# Patient Record
Sex: MALE | Race: WHITE | Employment: OTHER | URBAN - METROPOLITAN AREA
[De-identification: names, ages, dates, MRNs, and addresses within clinical notes are randomized per-mention and may not be internally consistent; named-entity substitution may affect disease eponyms.]

---

## 2020-06-27 ENCOUNTER — APPOINTMENT (OUTPATIENT)
Dept: CT IMAGING | Age: 40
End: 2020-06-27
Payer: COMMERCIAL

## 2020-06-27 ENCOUNTER — HOSPITAL ENCOUNTER (EMERGENCY)
Age: 40
Discharge: HOME | End: 2020-06-28
Payer: COMMERCIAL

## 2020-06-27 VITALS
WEIGHT: 300 LBS | BODY MASS INDEX: 42 KG/M2 | RESPIRATION RATE: 18 BRPM | OXYGEN SATURATION: 97 % | DIASTOLIC BLOOD PRESSURE: 88 MMHG | SYSTOLIC BLOOD PRESSURE: 151 MMHG | TEMPERATURE: 98.6 F | HEART RATE: 71 BPM | HEIGHT: 71 IN

## 2020-06-27 LAB
A/G RATIO: 1.2 (ref 1.1–2.2)
ALBUMIN SERPL-MCNC: 4.4 G/DL (ref 3.4–5)
ALP BLD-CCNC: 72 U/L (ref 40–129)
ALT SERPL-CCNC: 55 U/L (ref 10–40)
ANION GAP SERPL CALCULATED.3IONS-SCNC: 10 MMOL/L (ref 3–16)
AST SERPL-CCNC: 91 U/L (ref 15–37)
BASOPHILS ABSOLUTE: 0.1 K/UL (ref 0–0.2)
BASOPHILS RELATIVE PERCENT: 1 %
BILIRUB SERPL-MCNC: 0.4 MG/DL (ref 0–1)
BILIRUBIN URINE: NEGATIVE
BLOOD, URINE: ABNORMAL
BUN BLDV-MCNC: 22 MG/DL (ref 7–20)
CALCIUM SERPL-MCNC: 10 MG/DL (ref 8.3–10.6)
CHLORIDE BLD-SCNC: 98 MMOL/L (ref 99–110)
CLARITY: CLEAR
CO2: 30 MMOL/L (ref 21–32)
COLOR: YELLOW
CREAT SERPL-MCNC: 1.2 MG/DL (ref 0.9–1.3)
EOSINOPHILS ABSOLUTE: 0.2 K/UL (ref 0–0.6)
EOSINOPHILS RELATIVE PERCENT: 1.5 %
EPITHELIAL CELLS, UA: 0 /HPF (ref 0–5)
GFR AFRICAN AMERICAN: >60
GFR NON-AFRICAN AMERICAN: >60
GLOBULIN: 3.7 G/DL
GLUCOSE BLD-MCNC: 91 MG/DL (ref 70–99)
GLUCOSE URINE: NEGATIVE MG/DL
HCT VFR BLD CALC: 56.3 % (ref 40.5–52.5)
HEMOGLOBIN: 19.2 G/DL (ref 13.5–17.5)
HYALINE CASTS: 0 /LPF (ref 0–8)
KETONES, URINE: NEGATIVE MG/DL
LEUKOCYTE ESTERASE, URINE: NEGATIVE
LYMPHOCYTES ABSOLUTE: 2.6 K/UL (ref 1–5.1)
LYMPHOCYTES RELATIVE PERCENT: 24.2 %
MCH RBC QN AUTO: 32.3 PG (ref 26–34)
MCHC RBC AUTO-ENTMCNC: 34.1 G/DL (ref 31–36)
MCV RBC AUTO: 94.8 FL (ref 80–100)
MICROSCOPIC EXAMINATION: YES
MONOCYTES ABSOLUTE: 1 K/UL (ref 0–1.3)
MONOCYTES RELATIVE PERCENT: 9.8 %
NEUTROPHILS ABSOLUTE: 6.7 K/UL (ref 1.7–7.7)
NEUTROPHILS RELATIVE PERCENT: 63.5 %
NITRITE, URINE: NEGATIVE
PDW BLD-RTO: 15.3 % (ref 12.4–15.4)
PH UA: 6 (ref 5–8)
PLATELET # BLD: 265 K/UL (ref 135–450)
PMV BLD AUTO: 8.1 FL (ref 5–10.5)
POTASSIUM SERPL-SCNC: 3.7 MMOL/L (ref 3.5–5.1)
PROTEIN UA: 100 MG/DL
RBC # BLD: 5.94 M/UL (ref 4.2–5.9)
RBC UA: 8 /HPF (ref 0–4)
SODIUM BLD-SCNC: 138 MMOL/L (ref 136–145)
SPECIFIC GRAVITY UA: 1.03 (ref 1–1.03)
TOTAL PROTEIN: 8.1 G/DL (ref 6.4–8.2)
URINE REFLEX TO CULTURE: ABNORMAL
URINE TYPE: ABNORMAL
UROBILINOGEN, URINE: 0.2 E.U./DL
WBC # BLD: 10.6 K/UL (ref 4–11)
WBC UA: 1 /HPF (ref 0–5)

## 2020-06-27 PROCEDURE — 74176 CT ABD & PELVIS W/O CONTRAST: CPT

## 2020-06-27 PROCEDURE — 6360000002 HC RX W HCPCS: Performed by: NURSE PRACTITIONER

## 2020-06-27 PROCEDURE — 96374 THER/PROPH/DIAG INJ IV PUSH: CPT

## 2020-06-27 PROCEDURE — 80053 COMPREHEN METABOLIC PANEL: CPT

## 2020-06-27 PROCEDURE — 81003 URINALYSIS AUTO W/O SCOPE: CPT

## 2020-06-27 PROCEDURE — 96375 TX/PRO/DX INJ NEW DRUG ADDON: CPT

## 2020-06-27 PROCEDURE — 99284 EMERGENCY DEPT VISIT MOD MDM: CPT

## 2020-06-27 PROCEDURE — 85025 COMPLETE CBC W/AUTO DIFF WBC: CPT

## 2020-06-27 RX ORDER — CYCLOBENZAPRINE HCL 10 MG
10 TABLET ORAL 3 TIMES DAILY PRN
Qty: 21 TABLET | Refills: 0 | Status: SHIPPED | OUTPATIENT
Start: 2020-06-27 | End: 2020-07-07

## 2020-06-27 RX ORDER — LIDOCAINE 4 G/G
1 PATCH TOPICAL DAILY
Qty: 30 PATCH | Refills: 0 | Status: SHIPPED | OUTPATIENT
Start: 2020-06-27 | End: 2020-07-27

## 2020-06-27 RX ORDER — IBUPROFEN 800 MG/1
800 TABLET ORAL EVERY 8 HOURS PRN
Qty: 20 TABLET | Refills: 0 | Status: SHIPPED | OUTPATIENT
Start: 2020-06-27

## 2020-06-27 RX ORDER — ORPHENADRINE CITRATE 30 MG/ML
60 INJECTION INTRAMUSCULAR; INTRAVENOUS ONCE
Status: COMPLETED | OUTPATIENT
Start: 2020-06-27 | End: 2020-06-27

## 2020-06-27 RX ORDER — KETOROLAC TROMETHAMINE 30 MG/ML
30 INJECTION, SOLUTION INTRAMUSCULAR; INTRAVENOUS ONCE
Status: COMPLETED | OUTPATIENT
Start: 2020-06-27 | End: 2020-06-27

## 2020-06-27 RX ADMIN — KETOROLAC TROMETHAMINE 30 MG: 30 INJECTION, SOLUTION INTRAMUSCULAR at 23:30

## 2020-06-27 RX ADMIN — ORPHENADRINE CITRATE 60 MG: 30 INJECTION INTRAMUSCULAR; INTRAVENOUS at 23:30

## 2020-06-27 ASSESSMENT — ENCOUNTER SYMPTOMS
BACK PAIN: 1
CHEST TIGHTNESS: 0
SHORTNESS OF BREATH: 0
VOMITING: 0
NAUSEA: 0
DIARRHEA: 0
ABDOMINAL PAIN: 0

## 2020-06-27 ASSESSMENT — PAIN SCALES - GENERAL: PAINLEVEL_OUTOF10: 8

## 2020-06-28 NOTE — ED PROVIDER NOTES
[facility]        Pt Name: Michael Gentile  MRN: [id number]  [place] 1980  Date of evaluation: 6/27/2020  Provider: CLAUDE Hannah CNP  PCP: Cindy Olivas    Evaluation by SANDIE. My supervising physician was available for consultation. CHIEF COMPLAINT       Chief Complaint   Patient presents with    Flank Pain     R flank pain off and on for ten days. states his urine has been \"foamy\". states he has been having trouble getting an erection as well during this time        HISTORY OF PRESENT ILLNESS   (Location, Timing/Onset, Context/Setting, Quality, Duration, Modifying Factors, Severity, Associated Signs and Symptoms)  Note limiting factors. Michael Gentile is a 35 y.o. male presents to the ER with a complaint of right sided flank pain. He reports that 10 days ago he was doing light day at the gym and felt a pain to the right mid back. States that his urine is foamy and he dribbles at the end. He is concerned due to issues of difficulty maintaining an erection, states that he was intimate with his wife and his erection was \"different\". States then the next day during masturbation, ejaculation was very painful. He denies any blood or painful urination. Reports history of kidney stone but states that that pain was different. Reports history of normal PSA recently. His pain is reproducible. Worse with leaning forward and lifting weight or twisting. Denies any headache, fever, lightheadedness, dizziness, visual disturbances. No chest pain or pressure. No neck pain. No shortness of breath, cough, or congestion. No abdominal pain, nausea, vomiting, diarrhea, constipation, or dysuria. No rash. Nursing Notes were all reviewed and agreed with or any disagreements were addressed in the HPI.     REVIEW OF SYSTEMS    (2-9 systems for level 4, 10 or more for level 5)     Review of Systems Constitutional: Negative for activity change, chills and fever. Respiratory: Negative for chest tightness and shortness of breath. Cardiovascular: Negative for chest pain. Gastrointestinal: Negative for abdominal pain, diarrhea, nausea and vomiting. Genitourinary: Positive for flank pain. Negative for dysuria. Difficulty with erection   Musculoskeletal: Positive for back pain. All other systems reviewed and are negative. Positives and Pertinent negatives as per HPI. Except as noted above in the ROS, all other systems were reviewed and negative. PAST MEDICAL HISTORY     Past Medical History:   Diagnosis Date    ADD (attention deficit disorder with hyperactivity)     Anxiety     B[facility]     Bipolar disorder ([facility]     Hypertension     Panic attack          SURGICAL HISTORY     Past Surgical History:   Procedure Laterality Date    CYST REMOVAL           CURRENTMEDICATIONS       Previous Medications    CARBAMAZEPINE (EPITOL PO)    Take  by mouth 2 times daily. CARBAMAZEPINE (EPITOL) 200 MG TABLET    Take 200 mg by mouth 3 times daily. LISINOPRIL (PRINIVIL;ZESTRIL) 10 MG TABLET    Take 10 mg by mouth daily. LISINOPRIL (PRINIVIL;ZESTRIL) 20 MG TABLET    Take 20 mg by mouth daily. LORATADINE (CLARITIN) 10 MG CAPS    Take  by mouth. LORATADINE (CLARITIN) 10 MG TABLET    Take 10 mg by mouth daily. NAPROXEN (NAPROSYN) 500 MG TABLET    Take 1 tablet by mouth 2 times daily for 20 doses. NONFORMULARY    Indications: BP med, tricor, lamictal, lipitor, claritin. unsure of dosages     QUETIAPINE FUMARATE (SEROQUEL PO)    Take  by mouth daily. SERTRALINE (ZOLOFT) 100 MG TABLET    Take 100 mg by mouth daily. SERTRALINE HCL (ZOLOFT PO)    Take  by mouth daily. ALLERGIES     Codeine and Codeine    FAMILYHISTORY     History reviewed. No pertinent family history.        SOCIAL HISTORY       Social History     Tobacco Use    Smoking otherwise noted below, none     Procedures    CRITICAL CARE TIME   N/A    CONSULTS:  None      EMERGENCY DEPARTMENT COURSE and DIFFERENTIAL DIAGNOSIS/MDM:   Vitals:    Vitals:    06/27/20 2120 06/27/20 2330   BP: (!) 191/112 (!) 159/85   Pulse: 104    Resp: 18    Temp: 98.6 °F (37 °C)    TempSrc: Oral    SpO2: 96%    Weight: 300 lb (136.1 kg)    Height: 5' 11\" (1.803 m)        Patient was given the following medications:  Medications   ketorolac (TORADOL) injection 30 mg (30 mg Intravenous Given 6/27/20 2330)   orphenadrine (NORFLEX) injection 60 mg (60 mg Intravenous Given 6/27/20 2330)           Briefly, this is a 35year old male that  presents to the ER with a complaint of right sided flank pain. He reports that 10 days ago he was doing light day at the gym and felt a pain to the right mid back. States that his urine is foamy and he dribbles at the end. He is concerned due to issues of difficulty maintaining an erection, states that he was intimate with his wife and his erection was \"different\". States then the next day during masturbation, ejaculation was very painful. He denies any blood or painful urination. Reports history of kidney stone but states that that pain was different. Reports history of normal PSA recently. His pain is reproducible. Worse with leaning forward and lifting weight or twisting. Patient was given Toradol and Norflex here in the ER. He does have transaminitis seen on CMP, otherwise unremarkable. CBC is unremarkable. UA does show mild hematuria. CT ABDOMEN PELVIS WO CONTRAST Additional Contrast? None (Final result)   Result time 06/27/20 23:09:17   Final result by Darrell Guaman MD (06/27/20 23:09:17)                Impression:    No acute finding in the abdomen or pelvis.  Specifically, there is no  urolithiasis or acute obstructive uropathy.     The gallbladder is contracted.  No calcified gallstones.  If there is  clinical concern for gallbladder disease,

## 2020-07-02 ENCOUNTER — HOSPITAL ENCOUNTER (OUTPATIENT)
Dept: PHYSICAL THERAPY | Age: 40
Setting detail: THERAPIES SERIES
Discharge: HOME | End: 2020-07-02
Payer: COMMERCIAL

## 2020-07-02 PROCEDURE — 97110 THERAPEUTIC EXERCISES: CPT

## 2020-07-02 PROCEDURE — 97161 PT EVAL LOW COMPLEX 20 MIN: CPT

## 2020-07-02 PROCEDURE — 97530 THERAPEUTIC ACTIVITIES: CPT

## 2020-07-02 NOTE — PLAN OF CARE
[address] [address], [facility] [address]  Phone: [phone]   Fax: [phone]                                                       HOLD PT PENDING F/U WITH UROLOGIST (SEE BELOW)      Physical Therapy Certification    Dear Referring Practitioner: LY Johns,    We had the pleasure of evaluating the following patient for physical therapy services at [address] [place] [address]. A summary of our findings can be found in the initial assessment below. This includes our plan of care. If you have any questions or concerns regarding these findings, please do not hesitate to contact me at the office phone number checked above. Thank you for the referral.       Physician Signature:_______________________________Date:__________________  By signing above (or electronic signature), therapists plan is approved by physician      Patient: Shanna Lopez   : 1980   MRN: [id number]  Referring Physician: Referring Practitioner: LY Johns      Evaluation Date: 2020      Medical Diagnosis Information:  Diagnosis: R flank pain, musculoskeletal pain R10.9, M79.18   Treatment Diagnosis: Right flank pain, positive TTP over righit obliques, decreased intermittent tolerance to twisting and ROM                                         Insurance information: PT Insurance Information: Jammie Blanc 30 visits/year      Precautions/ Contra-indications:   Latex Allergy:  [x]NO      []YES  Preferred Language for Healthcare:   [x]English       []other:    SUBJECTIVE: Patient stated complaint:  Pt went to ED on 20 for right flank pain, on and off for 10 days, also reports other factors such as foamy urine and difficulty getting an erection.   Findings at the ER was unremarkable Reports that 10 days ago he was doing a light day at the gym and felt a pain to the right mid back.  Pain is worse with leaning forward and lifting weight or twisting. ED felt right flank pain was due to muscular strain. Thinks it started when he was doing leg press (1200#), which is normally a good weight for him, wasn't wearing the belt at the time, and felt something in his low back. Says the pain didn't go away and decided to go to the ER. Says he doesn't have any problems with squatting 4-500#, but feels it in groin area when doing press and only when doing heavy rows (125#). Pt is a heavy , lifting weights 6 days/week. Said he couldn't lie on his right side, but was given medication, and was able to lie on his right side now ever since. Says hurts when turning and rotation to his left side when in bed. Sitting and twisting doesn't bother him. CT scan showed no kidney stones, but currently since the injury has noticed some pain in his testes. Relevant Medical History:  ADD, Bipolar, HTN, Panic Attack  Functional Outcome:      Pain Scale: 0/10  At rest, 6-2/44 with certain movements, only with leg press, and lying in bed and rotating to left.     Easing factors:   Provocative factors:     Type: []Constant   [x]Intermittent  []Radiating []Localized []Other:     Numbness/Tingling: denies N/T in LE, stated had a couple days of N/T in hands, but nothing since     Occupation/School:      Living Status/Prior Level of Function:Prior to this injury / incident, pt was independent with ADLs and IADLs,  Lives with wife in a 2 story home      OBJECTIVE:   Palpation: positive TTP at right obliques in left sidelying     Functional Mobility/Transfers:     Posture:     Bandages/Dressings/Incisions:     Gait: (include devices/WB status)        PROM AROM    L R L R   Hip Flexion   110 110   Hip Abduction       Hip ER       Hip IR       Knee Flexion       Knee Extension       Dorsiflexion        Plantarflexion        Inversion        Eversion       Shoulder                         Strength (0-5) / Myotomes Left Right   Hip Flexion - supine 5 5   Hip Flexion - seated (L1-2) 5 5   Hip Abduction 5 5   Hip Adduction 5 5   Hip ER 5 5   Hip IR 5 5   Quads (L2-4) 5 5   Hamstrings     Ankle Dorsiflexion (L4-5)     Ankle Plantarflexion (S1-2)     Ankle Inversion     Ankle Eversion (S1-2)     Great Toe Extension (L5)          Shoulder Flex 5 5   Shoulder Abd 5 5   Shoulder IR/ER 5 5   TrA 4+ 4+   Multifidus  4+ 4+   Flexibility     Hamstrings (90/90)     ITB Zuleima Sanderson)     Quads (Ely's)     Hip Flexor Corina Suzy)          Girth     Mid patella     Suprapatellar     Figure 8     Transmalleolar     Metatarsal Heads         Joint mobility:    []Normal    []Hypo   []Hyper    Orthopaedic Special Tests  Positive  Negative  NT Comments    Hip       CAMMIE / Ezio's  x     FADIR  x     Scour  x     Trendelenburg       SLUMP  X     Knee       Lachman's / Anterior Drawer       Posterior Drawer       Varus Stress       Valgus Stress       Kendall's        Appley's       Thessaly's       Patellar Tracking              Ankle       Anterior Drawer       Talar Tilt       Manzano       Nara's                   Balance:                        [x] Patient history, allergies, meds reviewed. Medical chart reviewed. See intake form. Review Of Systems (ROS):  [x]Performed Review of systems (Integumentary, CardioPulmonary, Neurological) by intake and observation. Intake form has been scanned into medical record. Patient has been instructed to contact their primary care physician regarding ROS issues if not already being addressed at this time.       Co-morbidities/Complexities (which will affect course of rehabilitation):   []None           Arthritic conditions   []Rheumatoid arthritis (M05.9)  []Osteoarthritis (M19.91)   Cardiovascular conditions   []Hypertension (I10)  []Hyperlipidemia (E78.5)  []Angina pectoris (I20)  []Atherosclerosis (I70)   Musculoskeletal conditions   []Disc pathology   []Congenital spine pathologies   []Prior positions or transfers between positions   []Reduced ability to maintain good posture and demonstrate good body mechanics with sitting, bending, and lifting   [x]Reduced ability to sleep   [] Reduced ability or tolerance with driving and/or computer work   [x]Reduced ability to perform lifting, carrying tasks   []Reduced ability to squat   []Reduced ability to forward bend   []Reduced ability to ambulate prolonged functional periods/distances/surfaces   []Reduced ability to ascend/descend stairs   []Reduced ability to run, hop, cut or jump   []other:    Participation Restrictions   [x]Reduced participation in self care activities   []Reduced participation in home management activities   []Reduced participation in work activities   []Reduced participation in social activities. [x]Reduced participation in sport/recreation activities. Classification :    []Signs/symptoms consistent with post-surgical status including decreased ROM, strength and function.    [x]Signs/symptoms consistent with joint sprain/strain   []Signs/symptoms consistent with patella-femoral syndrome   []Signs/symptoms consistent with knee OA/hip OA   []Signs/symptoms consistent with internal derangement of knee/Hip   []Signs/symptoms consistent with functional hip weakness/NMR control      []Signs/symptoms consistent with tendinitis/tendinosis    []signs/symptoms consistent with pathology which may benefit from Dry needling      []other:      Prognosis/Rehab Potential:      []Excellent   [x]Good    []Fair   []Poor    Tolerance of evaluation/treatment:    []Excellent   [x]Good    []Fair   []Poor    Physical Therapy Evaluation Complexity Justification  [x] A history of present problem with:  [x] no personal factors and/or comorbidities that impact the plan of care;  []1-2 personal factors and/or comorbidities that impact the plan of care  []3 personal factors and/or comorbidities that impact the plan of care  [x] An examination of body systems using standardized tests and measures addressing any of the following: body structures and functions (impairments), activity limitations, and/or participation restrictions;:  [x] a total of 1-2 or more elements   [] a total of 3 or more elements   [] a total of 4 or more elements   [x] A clinical presentation with:  [x] stable and/or uncomplicated characteristics   [] evolving clinical presentation with changing characteristics  [] unstable and unpredictable characteristics;   [x] Clinical decision making of [x] low, [] moderate, [] high complexity using standardized patient assessment instrument and/or measurable assessment of functional outcome. [x] EVAL (LOW) 27162 (typically 15 minutes face-to-face)  [] EVAL (MOD) 16945 (typically 30 minutes face-to-face)  [] EVAL (HIGH) 92219 (typically 45 minutes face-to-face)  [] RE-EVAL     PLAN:   Frequency/Duration:  1-2x/week x 4 weeks, but currently on hold   Interventions:  [x]  Therapeutic exercise including: strength training, ROM, for Lower extremity and core   [x]  NMR activation and proprioception for LE, Glutes and Core   [x]  Manual therapy as indicated for LE, Hip and spine to include: Dry Needling/IASTM, STM, PROM, Gr I-IV mobilizations, manipulation. [x] Modalities as needed that may include: thermal agents, E-stim, Biofeedback, US, iontophoresis as indicated  [x] Patient education on joint protection, postural re-education, activity modification, progression of HEP. HEP instruction: Written HEP instructions provided and reviewed:       Access Code: [email or web address]   URL: [email or web address]   Date: 07/02/2020   Prepared by: Diane Patel     Exercises  Sidelying Thoracic Lumbar Rotation - 10 reps - 10 secs hold - 2x daily - 7x weekly  Thoracic Sidebending on Swiss Ball - 10 reps - 10 secs hold - 2x daily - 7x weekly  Kneeling Thoracic Sidebending with Swiss Ball - 10 reps - 10 hold  - 2x daily - 7x weekly  Side Plank on Elbow - 3 reps - 20-30 secs hold - 2x daily - 7x weekly  Standard Plank - 3 reps - 20-30 secs hold - 2x daily - 7x weekly  Diagonal Curl Up with Arms Crossed - 10 reps - 2-3 sets - 2x daily - 7x weekly  TL Sidebending Stretch - Single Arm Overhead - 10 reps - 5-10 secs hold - 2x daily - 7x weekly    GOALS:  Patient stated goal: get better   [] Progressing: [] Met: [] Not Met: [] Adjusted    Therapist goals for Patient:   Short Term Goals: To be achieved in: 2 weeks  1. Independent in HEP and progression per patient tolerance, in order to prevent re-injury. [] Progressing: [x] Met: [] Not Met: [] Adjusted  2. Patient will have a decrease in pain to facilitate improvement in movement, function, and ADLs as indicated by Functional Deficits.   [x] Progressing: [] Met: [] Not Met: [] Adjusted         Electronically signed by:  Yandy Tesfaye PT

## 2020-08-08 ENCOUNTER — APPOINTMENT (OUTPATIENT)
Dept: GENERAL RADIOLOGY | Age: 40
End: 2020-08-08
Payer: COMMERCIAL

## 2020-08-08 ENCOUNTER — HOSPITAL ENCOUNTER (EMERGENCY)
Age: 40
Discharge: HOME | End: 2020-08-08
Payer: COMMERCIAL

## 2020-08-08 VITALS
HEIGHT: 71 IN | HEART RATE: 99 BPM | TEMPERATURE: 98.4 F | OXYGEN SATURATION: 94 % | RESPIRATION RATE: 20 BRPM | SYSTOLIC BLOOD PRESSURE: 185 MMHG | WEIGHT: 315 LBS | BODY MASS INDEX: 44.1 KG/M2 | DIASTOLIC BLOOD PRESSURE: 93 MMHG

## 2020-08-08 PROCEDURE — 73630 X-RAY EXAM OF FOOT: CPT

## 2020-08-08 PROCEDURE — 99283 EMERGENCY DEPT VISIT LOW MDM: CPT

## 2020-08-08 PROCEDURE — 29515 APPLICATION SHORT LEG SPLINT: CPT

## 2020-08-08 PROCEDURE — 6370000000 HC RX 637 (ALT 250 FOR IP): Performed by: PHYSICIAN ASSISTANT

## 2020-08-08 RX ORDER — OXYCODONE HYDROCHLORIDE AND ACETAMINOPHEN 5; 325 MG/1; MG/1
1-2 TABLET ORAL EVERY 6 HOURS PRN
Qty: 10 TABLET | Refills: 0 | Status: SHIPPED | OUTPATIENT
Start: 2020-08-08 | End: 2020-08-11

## 2020-08-08 RX ORDER — OXYCODONE HYDROCHLORIDE AND ACETAMINOPHEN 5; 325 MG/1; MG/1
1 TABLET ORAL ONCE
Status: COMPLETED | OUTPATIENT
Start: 2020-08-08 | End: 2020-08-08

## 2020-08-08 RX ADMIN — OXYCODONE HYDROCHLORIDE AND ACETAMINOPHEN 1 TABLET: 5; 325 TABLET ORAL at 18:41

## 2020-08-08 ASSESSMENT — ENCOUNTER SYMPTOMS
ABDOMINAL PAIN: 0
NAUSEA: 0
SHORTNESS OF BREATH: 0
VOMITING: 0

## 2020-08-08 ASSESSMENT — PAIN DESCRIPTION - PAIN TYPE: TYPE: ACUTE PAIN

## 2020-08-08 ASSESSMENT — PAIN SCALES - GENERAL
PAINLEVEL_OUTOF10: 9
PAINLEVEL_OUTOF10: 9

## 2020-08-08 ASSESSMENT — PAIN DESCRIPTION - ORIENTATION: ORIENTATION: LEFT

## 2020-08-08 ASSESSMENT — PAIN DESCRIPTION - LOCATION: LOCATION: FOOT

## 2020-08-08 NOTE — ED PROVIDER NOTES
[facility]        Pt Name: Lisa Bermudez  MRN: [id number]  [place] 1980  Date of evaluation: 8/8/2020  Provider: Gianni Lamb PA-C  PCP: Fei Postal    Evaluation by SANDIE. My supervising physician was available for consultation. CHIEF COMPLAINT       Chief Complaint   Patient presents with    Foot Injury     Pt. comes in today after he injured his right foot at the gym. Pt. reports he had 180lb weight fall on his foot. Pt. reports that this happened at the gym shortly before arrival.        HISTORY OF PRESENT ILLNESS   (Location, Timing/Onset, Context/Setting, Quality, Duration, Modifying Factors, Severity, Associated Signs and Symptoms)  Note limiting factors. Lisa Bermudez is a 35 y.o. male patient presents emergency department for evaluation of possible injury to right foot. Patient states he was weightlifting with a partner. Patient states his partner was changing the weights on the bar and accidentally dropped it directly onto his right foot. Patient states it was approximately 118 pounds. Patient states initially a he did not have significant pain. Patient did try to continue to work out. Patient then noticed he developed swelling and was unable to bear weight. Patient denies any other injuries at this time. Patient states he has normal sensations in his foot. Nursing Notes were all reviewed and agreed with or any disagreements were addressed in the HPI. REVIEW OF SYSTEMS    (2-9 systems for level 4, 10 or more for level 5)     Review of Systems   Constitutional: Negative for fatigue and fever. HENT: Negative. Eyes: Negative for visual disturbance. Respiratory: Negative for shortness of breath. Cardiovascular: Negative for chest pain. Gastrointestinal: Negative for abdominal pain, nausea and vomiting. Genitourinary: Negative. Musculoskeletal: Positive for arthralgias. Skin: Negative. Neurological: Negative. Positives and Pertinent negatives as per HPI. Except as noted above in the ROS, all other systems were reviewed and negative. PAST MEDICAL HISTORY     Past Medical History:   Diagnosis Date    ADD (attention deficit disorder with hyperactivity)     Anxiety     B[facility] 1 disorder ([facility]     Bipolar disorder ([facility]     Hypertension     Panic attack          SURGICAL HISTORY     Past Surgical History:   Procedure Laterality Date    CYST REMOVAL           CURRENTMEDICATIONS       Previous Medications    CARBAMAZEPINE (EPITOL PO)    Take  by mouth 2 times daily. CARBAMAZEPINE (EPITOL) 200 MG TABLET    Take 200 mg by mouth 3 times daily. IBUPROFEN (ADVIL;MOTRIN) 800 MG TABLET    Take 1 tablet by mouth every 8 hours as needed for Pain or Fever    LISINOPRIL (PRINIVIL;ZESTRIL) 10 MG TABLET    Take 10 mg by mouth daily. LISINOPRIL (PRINIVIL;ZESTRIL) 20 MG TABLET    Take 20 mg by mouth daily. LORATADINE (CLARITIN) 10 MG CAPS    Take  by mouth. LORATADINE (CLARITIN) 10 MG TABLET    Take 10 mg by mouth daily. NAPROXEN (NAPROSYN) 500 MG TABLET    Take 1 tablet by mouth 2 times daily for 20 doses. NONFORMULARY    Indications: BP med, tricor, lamictal, lipitor, claritin. unsure of dosages     QUETIAPINE FUMARATE (SEROQUEL PO)    Take  by mouth daily. SERTRALINE (ZOLOFT) 100 MG TABLET    Take 100 mg by mouth daily. SERTRALINE HCL (ZOLOFT PO)    Take  by mouth daily. ALLERGIES     Codeine and Codeine    FAMILYHISTORY     History reviewed. No pertinent family history.        SOCIAL HISTORY       Social History     Tobacco Use    Smoking status: Former Smoker     Packs/day: 1.00     Types: Cigarettes    Smokeless tobacco: Never Used   Substance Use Topics    Alcohol use: No    Drug use: No       SCREENINGS             PHYSICAL EXAM    (up to 7 for level 4, 8 or more for level 5)     ED Triage Vitals [08/08/20 1810] Capillary refill less than 2 seconds. Left foot appears grossly normal.  X-ray shows comminuted displaced fracture of the first metatarsal and slightly displaced fracture of the second metatarsal.  Patient will be placed in posterior short leg splint and given crutches for use. Patient was given Percocet as well as ice here in the emergency department. Patient encouraged to use Percocet at home as well as ibuprofen, rest, ice and elevation. Patient will follow-up with orthopedics for further management of his fractures. Patient was given return precautions for any worsening pain or lack of sensation in the foot. Patient is amenable to this outpatient plan will be discharged home. I estimate there is LOW risk for FRACTURE, COMPARTMENT SYNDROME, DEEP VENOUS THROMBOSIS, SEPTIC ARTHRITIS, TENDON OR NEUROVASCULAR INJURY, thus I consider the discharge disposition reasonable. FINAL IMPRESSION      1. Displaced fracture of first metatarsal bone, right foot, initial encounter for closed fracture    2. Closed displaced fracture of second metatarsal bone of right foot, initial encounter    3. Injury while weightlifting          DISPOSITION/PLAN   DISPOSITION Decision To Discharge 08/08/2020 06:45:54 PM      PATIENT REFERREDTO:  Jc Patino MD  [address] [place] [address] [address]  [facility] [place] [address]  [phone]    Schedule an appointment as soon as possible for a visit in 3 days  for re-evaluation    [facility] Emergency Department  [address] [facility]  [phone]    If symptoms worsen      DISCHARGE MEDICATIONS:  New Prescriptions    OXYCODONE-ACETAMINOPHEN (PERCOCET) 5-325 MG PER TABLET    Take 1-2 tablets by mouth every 6 hours as needed for Pain for up to 3 days. WARNING:  May cause drowsiness. May impair ability to operate vehicles or machinery. Do not use in combination with alcohol.        DISCONTINUED MEDICATIONS:  Discontinued Medications    No medications on file (Please note that portions of this note were completed with a voice recognition program.  Efforts were made to edit the dictations but occasionally words are mis-transcribed.)    Ad Hector PA-C (electronically signed)            Ad eHctor PA-C  08/08/20 [id number]

## 2020-08-09 NOTE — ED NOTES
Per PA, splint is ok. Pt given crutches and returns demo. Nursing Discharge Notes:    -Patient discharged at this time in no acute distress after verbalizing understanding of discharge instructions and need for follow up with PCP and/or specialist if one was referred.  -A copy of the AVS was reviewed with pt and/or family.  -Pt received applicable scripts which were reviewed with pt and/or family by this RN. -Pt was given the opportunity to ask questions before signing for discharge. Patient Mobility at Discharge:    -Pt left in a wheelchair. Patient Education:    Learner - Patient and/or family / caregiver. Motivation and Readiness To Learn - Medium to High  Barriers To Learning - None  Learning Preference / Provided Instructions - Both written and verbal discharge instructions.      Sal Severino RN  08/08/20 2006

## 2020-08-11 ENCOUNTER — OFFICE VISIT (OUTPATIENT)
Dept: ORTHOPEDIC SURGERY | Age: 40
End: 2020-08-11
Payer: COMMERCIAL

## 2020-08-11 ENCOUNTER — TELEPHONE (OUTPATIENT)
Dept: ORTHOPEDIC SURGERY | Age: 40
End: 2020-08-11

## 2020-08-11 VITALS — BODY MASS INDEX: 44.1 KG/M2 | TEMPERATURE: 97.9 F | WEIGHT: 315 LBS | HEIGHT: 71 IN

## 2020-08-11 PROCEDURE — 1036F TOBACCO NON-USER: CPT | Performed by: ORTHOPAEDIC SURGERY

## 2020-08-11 PROCEDURE — G8427 DOCREV CUR MEDS BY ELIG CLIN: HCPCS | Performed by: ORTHOPAEDIC SURGERY

## 2020-08-11 PROCEDURE — 99203 OFFICE O/P NEW LOW 30 MIN: CPT | Performed by: ORTHOPAEDIC SURGERY

## 2020-08-11 PROCEDURE — G8419 CALC BMI OUT NRM PARAM NOF/U: HCPCS | Performed by: ORTHOPAEDIC SURGERY

## 2020-08-11 PROCEDURE — L4360 PNEUMAT WALKING BOOT PRE CST: HCPCS | Performed by: ORTHOPAEDIC SURGERY

## 2020-08-11 PROCEDURE — 10140 I&D HMTMA SEROMA/FLUID COLLJ: CPT | Performed by: ORTHOPAEDIC SURGERY

## 2020-08-11 RX ORDER — OXYCODONE HYDROCHLORIDE AND ACETAMINOPHEN 5; 325 MG/1; MG/1
1 TABLET ORAL EVERY 6 HOURS PRN
Qty: 20 TABLET | Refills: 0 | Status: SHIPPED | OUTPATIENT
Start: 2020-08-11 | End: 2020-08-18 | Stop reason: SDUPTHER

## 2020-08-11 RX ORDER — CEPHALEXIN 500 MG/1
500 CAPSULE ORAL 4 TIMES DAILY
Qty: 40 CAPSULE | Refills: 0 | Status: SHIPPED | OUTPATIENT
Start: 2020-08-11 | End: 2020-08-18 | Stop reason: SDUPTHER

## 2020-08-11 NOTE — LETTER
[facility]  [address] [facility] [place]  [address] [place] [address] [address]  Phone: [phone]  Fax: [phone]     Nolan Christian MD           8/11/20       Thong Fay  1980       Order: knee scooter  Diagnosis: Right foot first and second metatarsal fracture        Sincerely,      Nolan Christian MD

## 2020-08-11 NOTE — PROGRESS NOTES
CHIEF COMPLAINT: Right foot pain/ 1st and 2nd MT shaft comminuted moderately displaced fracture. DATE OF INJURY:  8/8/2020    HISTORY:  Mr. Joey Wilson 35 y.o.   male  presents today for the first visit for evaluation of a right foot injury which occurred when he was in gym and some one dropped 120 lb weight on his foot. He is complaining of lateral foot pain and swelling. Rates pain a 10/10 VAS. This is better with elevation and worse with bearing any wt. The pain is sharp and not radiating. No other complaint. He was seen 1st at [facility] ED, where he was x-rayed and splinted and asked to f/u with orthopaedics.     Past Medical History:   Diagnosis Date    ADD (attention deficit disorder with hyperactivity)     Anxiety     [facility]ipolar [facility] disorder ([facility])     Bipolar disorder ([facility] [address]     Hypertension     Panic attack        Past Surgical History:   Procedure Laterality Date    CYST REMOVAL         Social History     Socioeconomic History    Marital status: Single     Spouse name: Not on file    Number of children: Not on file    Years of education: Not on file    Highest education level: Not on file   Occupational History    Not on file   Social Needs    Financial resource strain: Not on file    Food insecurity     Worry: Not on file     Inability: Not on file    Transportation needs     Medical: Not on file     Non-medical: Not on file   Tobacco Use    Smoking status: Former Smoker     Packs/day: 1.00     Types: Cigarettes    Smokeless tobacco: Never Used   Substance and Sexual Activity    Alcohol use: No    Drug use: No    Sexual activity: Not on file   Lifestyle    Physical activity     Days per week: Not on file     Minutes per session: Not on file    Stress: Not on file   Relationships    Social connections     Talks on phone: Not on file     Gets together: Not on file     Attends Judaism service: Not on file     Active member of club or organization: Not on file     Attends meetings of clubs or organizations: Not on file     Relationship status: Not on file    Intimate partner violence     Fear of current or ex partner: Not on file     Emotionally abused: Not on file     Physically abused: Not on file     Forced sexual activity: Not on file   Other Topics Concern    Not on file   Social History Narrative    ** Merged History Encounter **            No family history on file. Current Outpatient Medications on File Prior to Visit   Medication Sig Dispense Refill    oxyCODONE-acetaminophen (PERCOCET) 5-325 MG per tablet Take 1-2 tablets by mouth every 6 hours as needed for Pain for up to 3 days. WARNING:  May cause drowsiness. May impair ability to operate vehicles or machinery. Do not use in combination with alcohol. 10 tablet 0    NONFORMULARY Indications: BP med, tricor, lamictal, lipitor, claritin. unsure of dosages       ibuprofen (ADVIL;MOTRIN) 800 MG tablet Take 1 tablet by mouth every 8 hours as needed for Pain or Fever 20 tablet 0    lisinopril (PRINIVIL;ZESTRIL) 20 MG tablet Take 20 mg by mouth daily.  Sertraline HCl (ZOLOFT PO) Take  by mouth daily.  CarBAMazepine (EPITOL PO) Take  by mouth 2 times daily.  QUEtiapine Fumarate (SEROQUEL PO) Take  by mouth daily.  Loratadine (CLARITIN) 10 MG CAPS Take  by mouth.  naproxen (NAPROSYN) 500 MG tablet Take 1 tablet by mouth 2 times daily for 20 doses. 20 tablet 0    lisinopril (PRINIVIL;ZESTRIL) 10 MG tablet Take 10 mg by mouth daily.  sertraline (ZOLOFT) 100 MG tablet Take 100 mg by mouth daily.  loratadine (CLARITIN) 10 MG tablet Take 10 mg by mouth daily.  carBAMazepine (EPITOL) 200 MG tablet Take 200 mg by mouth 3 times daily. No current facility-administered medications on file prior to visit. Pertinent items are noted in HPI  Review of systems reviewed from Patient History Form dated on 8/11/2020 and available in the patient's chart under the Media tab.  No the right foot dorsal area was prepared and draped with alcohol and sharp débridement using #15 blade. The patient tolerated the procedure well. A Drsg was applied and the patient was advised to ice the big toe for 15-20 minutes to relieve any wound site related pain. PO ABX given. F/U in one week for wound check.       Leeanna Gosselin, MD

## 2020-08-18 ENCOUNTER — OFFICE VISIT (OUTPATIENT)
Dept: ORTHOPEDIC SURGERY | Age: 40
End: 2020-08-18
Payer: COMMERCIAL

## 2020-08-18 ENCOUNTER — TELEPHONE (OUTPATIENT)
Dept: ORTHOPEDIC SURGERY | Age: 40
End: 2020-08-18

## 2020-08-18 VITALS — BODY MASS INDEX: 44.1 KG/M2 | TEMPERATURE: 98.1 F | HEIGHT: 71 IN | WEIGHT: 315 LBS

## 2020-08-18 PROBLEM — S92.312A: Status: ACTIVE | Noted: 2020-08-18

## 2020-08-18 PROBLEM — S92.321A CLOSED DISPLACED FRACTURE OF SECOND METATARSAL BONE OF RIGHT FOOT: Status: ACTIVE | Noted: 2020-08-18

## 2020-08-18 PROBLEM — S90.821A BLISTER OF RIGHT FOOT: Status: ACTIVE | Noted: 2020-08-18

## 2020-08-18 PROCEDURE — G8417 CALC BMI ABV UP PARAM F/U: HCPCS | Performed by: ORTHOPAEDIC SURGERY

## 2020-08-18 PROCEDURE — 1036F TOBACCO NON-USER: CPT | Performed by: ORTHOPAEDIC SURGERY

## 2020-08-18 PROCEDURE — 99024 POSTOP FOLLOW-UP VISIT: CPT | Performed by: ORTHOPAEDIC SURGERY

## 2020-08-18 PROCEDURE — G8427 DOCREV CUR MEDS BY ELIG CLIN: HCPCS | Performed by: ORTHOPAEDIC SURGERY

## 2020-08-18 PROCEDURE — 99214 OFFICE O/P EST MOD 30 MIN: CPT | Performed by: ORTHOPAEDIC SURGERY

## 2020-08-18 RX ORDER — SULFAMETHOXAZOLE AND TRIMETHOPRIM 400; 80 MG/1; MG/1
1 TABLET ORAL 2 TIMES DAILY
Qty: 20 TABLET | Refills: 0 | Status: SHIPPED | OUTPATIENT
Start: 2020-08-18 | End: 2020-08-26 | Stop reason: SDUPTHER

## 2020-08-18 RX ORDER — CEPHALEXIN 500 MG/1
500 CAPSULE ORAL 4 TIMES DAILY
Qty: 40 CAPSULE | Refills: 0 | Status: SHIPPED | OUTPATIENT
Start: 2020-08-18 | End: 2020-08-26 | Stop reason: SDUPTHER

## 2020-08-18 RX ORDER — OXYCODONE HYDROCHLORIDE AND ACETAMINOPHEN 5; 325 MG/1; MG/1
1 TABLET ORAL EVERY 6 HOURS PRN
Qty: 20 TABLET | Refills: 0 | Status: SHIPPED | OUTPATIENT
Start: 2020-08-18 | End: 2020-08-23

## 2020-08-18 NOTE — TELEPHONE ENCOUNTER
PATIENT SAYS THAT DR WAS GOING TO SEND 2 RX OVER TO THE PHARMACY FOR AN ANTIBIOTIC AND PERCOCET.  REQ CALLBACK TO CONFIRM

## 2020-08-19 NOTE — PROGRESS NOTES
CHIEF COMPLAINT: Right foot pain/ 1st and 2nd MT shaft comminuted moderately displaced fracture. DATE OF INJURY:  8/8/2020    HISTORY:  Mr. Bj Kennedy 35 y.o.   male  presents today for f/u evaluation of a right foot injury which occurred when he was in gym and some one dropped 120 lb weight on his foot. He is complaining of lateral foot pain and swelling. Rates pain a 8/10 VAS. This is better with elevation and worse with bearing any wt. The pain is sharp and not radiating. No other complaint. He was seen 1st at [facility] ED, where he was x-rayed and splinted and asked to f/u with orthopaedics.     Past Medical History:   Diagnosis Date    ADD (attention deficit disorder with hyperactivity)     Anxiety     [facility])     Bipolar disorder ([facility] [address]     Hypertension     Panic attack        Past Surgical History:   Procedure Laterality Date    CYST REMOVAL         Social History     Socioeconomic History    Marital status: Single     Spouse name: Not on file    Number of children: Not on file    Years of education: Not on file    Highest education level: Not on file   Occupational History    Not on file   Social Needs    Financial resource strain: Not on file    Food insecurity     Worry: Not on file     Inability: Not on file    Transportation needs     Medical: Not on file     Non-medical: Not on file   Tobacco Use    Smoking status: Former Smoker     Packs/day: 1.00     Types: Cigarettes    Smokeless tobacco: Never Used   Substance and Sexual Activity    Alcohol use: No    Drug use: No    Sexual activity: Not on file   Lifestyle    Physical activity     Days per week: Not on file     Minutes per session: Not on file    Stress: Not on file   Relationships    Social connections     Talks on phone: Not on file     Gets together: Not on file     Attends Zoroastrianism service: Not on file     Active member of club or organization: Not on file     Attends meetings of clubs or weight is (!) 328 lb (148.8 kg), Body mass index is 45.75 kg/m². Resting respiratory rate is 16. Examination of the gait, showed that the patient walks with a crutch, NWB right  leg . Examination of both ankles showing a decreased range of motion of the right ankle compare to the other side because of foot pain. There is moderate swelling that can be seen, as well as ecchymosis over lateral side of the right foot with large dorsal foot fracture blisters. He  has intact sensation and good pedal pulses. He has significant tenderness on deep palpation over the 1st and 2nd MT shaft right foot              IMAGING: Xray's were reviewed, dated 8/8/2020, 3 views of the right  foot, and showed a moderately displaced 1st and 2nd MT shaft fracture. IMPRESSION: Right foot pain/ 1st and 2nd MT shaft comminuted moderately displaced fracture. PLAN:  I discussed with Mac Gerber the overall alignment of the fracture and treatment options including both surgical and non-surgical treatment, and that my recommendation is an open reduction and internal fixation given the amount of displacement and comminution of the fracture. I discussed the risks and benefits of surgery with the patient, including but not limited to infection, bleeding, pain, injury to nerves or blood vessels failure of the surgery and need for additional surgery. All the patient's questions were answered. We discussed an expected post-operative course. He  is understanding of this and wishes to proceed. Drsg was applied and the patient was advised to ice the big toe for 15-20 minutes to relieve any wound site related pain. Continue PO ABX, Rx given. F/U in one week for wound check.       Laureano Landa MD

## 2020-08-25 ENCOUNTER — OFFICE VISIT (OUTPATIENT)
Dept: ORTHOPEDIC SURGERY | Age: 40
End: 2020-08-25
Payer: COMMERCIAL

## 2020-08-25 VITALS — TEMPERATURE: 98.6 F

## 2020-08-25 PROCEDURE — 1036F TOBACCO NON-USER: CPT | Performed by: ORTHOPAEDIC SURGERY

## 2020-08-25 PROCEDURE — G8417 CALC BMI ABV UP PARAM F/U: HCPCS | Performed by: ORTHOPAEDIC SURGERY

## 2020-08-25 PROCEDURE — 99214 OFFICE O/P EST MOD 30 MIN: CPT | Performed by: ORTHOPAEDIC SURGERY

## 2020-08-25 PROCEDURE — G8427 DOCREV CUR MEDS BY ELIG CLIN: HCPCS | Performed by: ORTHOPAEDIC SURGERY

## 2020-08-25 NOTE — PROGRESS NOTES
CHIEF COMPLAINT: Right foot pain/ 1st and 2nd MT shaft comminuted moderately displaced fracture. DATE OF INJURY:  8/8/2020    HISTORY:  Mr. Stuart Thorne 35 y.o.   male  presents today for f/u evaluation of a right foot injury which occurred when he was in gym and some one dropped 180 lb weight on his foot. He is complaining of lateral foot pain and swelling. Rates pain a 8/10 VAS. This is better with elevation and worse with bearing any wt. he is asking for more pain medicine. The pain is sharp and not radiating. He was started on Keflex and Bactrim for the wounds. Denies fever or chills. He is currently taking aspirin 325 mg daily for DVT prophylaxis. No other complaint. He was seen 1st at [facility] ED, where he was x-rayed and splinted and asked to f/u with orthopaedics.     Past Medical History:   Diagnosis Date    ADD (attention deficit disorder with hyperactivity)     Anxiety     Bipolar 1 disorder ([facility])     B[facility] disorder ([facility] [address]     Hypertension     Panic attack        Past Surgical History:   Procedure Laterality Date    CYST REMOVAL         Social History     Socioeconomic History    Marital status: Single     Spouse name: Not on file    Number of children: Not on file    Years of education: Not on file    Highest education level: Not on file   Occupational History    Not on file   Social Needs    Financial resource strain: Not on file    Food insecurity     Worry: Not on file     Inability: Not on file    Transportation needs     Medical: Not on file     Non-medical: Not on file   Tobacco Use    Smoking status: Former Smoker     Packs/day: 1.00     Types: Cigarettes    Smokeless tobacco: Never Used   Substance and Sexual Activity    Alcohol use: No    Drug use: No    Sexual activity: Not on file   Lifestyle    Physical activity     Days per week: Not on file     Minutes per session: Not on file    Stress: Not on file   Relationships    Social connections     Talks on phone: Not on file     Gets together: Not on file     Attends Sabianist service: Not on file     Active member of club or organization: Not on file     Attends meetings of clubs or organizations: Not on file     Relationship status: Not on file    Intimate partner violence     Fear of current or ex partner: Not on file     Emotionally abused: Not on file     Physically abused: Not on file     Forced sexual activity: Not on file   Other Topics Concern    Not on file   Social History Narrative    ** Merged History Encounter **            History reviewed. No pertinent family history. Current Outpatient Medications on File Prior to Visit   Medication Sig Dispense Refill    cephALEXin (KEFLEX) 500 MG capsule Take 1 capsule by mouth 4 times daily for 10 days 40 capsule 0    sulfamethoxazole-trimethoprim (BACTRIM) 400-80 MG per tablet Take 1 tablet by mouth 2 times daily for 10 days 20 tablet 0    NONFORMULARY Indications: BP med, tricor, lamictal, lipitor, claritin. unsure of dosages       ibuprofen (ADVIL;MOTRIN) 800 MG tablet Take 1 tablet by mouth every 8 hours as needed for Pain or Fever 20 tablet 0    lisinopril (PRINIVIL;ZESTRIL) 20 MG tablet Take 20 mg by mouth daily.  Sertraline HCl (ZOLOFT PO) Take  by mouth daily.  CarBAMazepine (EPITOL PO) Take  by mouth 2 times daily.  QUEtiapine Fumarate (SEROQUEL PO) Take  by mouth daily.  Loratadine (CLARITIN) 10 MG CAPS Take  by mouth.  naproxen (NAPROSYN) 500 MG tablet Take 1 tablet by mouth 2 times daily for 20 doses. 20 tablet 0    lisinopril (PRINIVIL;ZESTRIL) 10 MG tablet Take 10 mg by mouth daily.  sertraline (ZOLOFT) 100 MG tablet Take 100 mg by mouth daily.  loratadine (CLARITIN) 10 MG tablet Take 10 mg by mouth daily.  carBAMazepine (EPITOL) 200 MG tablet Take 200 mg by mouth 3 times daily. No current facility-administered medications on file prior to visit.         Pertinent items are noted in HPI  Review of systems reviewed from Patient History Form dated on 8/11/2020 and available in the patient's chart under the Media tab. No change. PHYSICAL EXAMINATION:  Mr. Sia Ortiz is a very pleasant 35 y.o.  male who presents today in no acute distress, awake, alert, and oriented. He is well dressed, nourished and  groomed. Patient with normal affect. Height is   , weight is  , There is no height or weight on file to calculate BMI. Resting respiratory rate is 16. Examination of the gait, showed that the patient walks with a crutch, NWB right  leg . Examination of both ankles showing a decreased range of motion of the right ankle compare to the other side because of foot pain. There is moderate swelling that can be seen, as well as ecchymosis over lateral side of the right foot with large dorsal foot fracture blisters with eschar that is sloughing. He  has intact sensation and good pedal pulses. He has significant tenderness on deep palpation over the 1st and 2nd MT shaft right foot                  IMAGING: Xray's were reviewed, dated 8/8/2020, 3 views of the right  foot, and showed a moderately displaced 1st and 2nd MT shaft fracture. IMPRESSION: Right foot pain/ 1st and 2nd MT shaft comminuted moderately displaced fracture with wound. PLAN:  I discussed with Hiral Gerber the overall alignment of the fracture and treatment options including both surgical and non-surgical treatment, and that my recommendation is an open reduction and internal fixation given the amount of displacement and comminution of the fracture. I discussed the risks and benefits of surgery with the patient, including but not limited to infection, bleeding, pain, injury to nerves or blood vessels failure of the surgery and need for additional surgery. All the patient's questions were answered. We discussed an expected post-operative course. He  is understanding of this and wishes to proceed.     Lesly was applied and the patient was advised to ice the big toe for 15-20 minutes to relieve any wound site related pain. Continue PO ABX, Rx given Keflex and Bactrim. F/U in one week for wound check.       Patricia Desai MD

## 2020-08-26 RX ORDER — CEPHALEXIN 500 MG/1
500 CAPSULE ORAL 4 TIMES DAILY
Qty: 40 CAPSULE | Refills: 0 | Status: SHIPPED | OUTPATIENT
Start: 2020-08-26 | End: 2020-09-05

## 2020-08-26 RX ORDER — SULFAMETHOXAZOLE AND TRIMETHOPRIM 400; 80 MG/1; MG/1
1 TABLET ORAL 2 TIMES DAILY
Qty: 20 TABLET | Refills: 0 | Status: SHIPPED | OUTPATIENT
Start: 2020-08-26 | End: 2020-09-01 | Stop reason: SDUPTHER

## 2020-08-26 RX ORDER — HYDROCODONE BITARTRATE AND ACETAMINOPHEN 5; 325 MG/1; MG/1
1 TABLET ORAL EVERY 6 HOURS PRN
Qty: 20 TABLET | Refills: 0 | Status: SHIPPED | OUTPATIENT
Start: 2020-08-26 | End: 2020-08-31

## 2020-09-01 ENCOUNTER — OFFICE VISIT (OUTPATIENT)
Dept: ORTHOPEDIC SURGERY | Age: 40
End: 2020-09-01
Payer: COMMERCIAL

## 2020-09-01 VITALS — BODY MASS INDEX: 44.1 KG/M2 | HEIGHT: 71 IN | TEMPERATURE: 98.2 F | WEIGHT: 315 LBS

## 2020-09-01 PROCEDURE — G8417 CALC BMI ABV UP PARAM F/U: HCPCS | Performed by: ORTHOPAEDIC SURGERY

## 2020-09-01 PROCEDURE — G8427 DOCREV CUR MEDS BY ELIG CLIN: HCPCS | Performed by: ORTHOPAEDIC SURGERY

## 2020-09-01 PROCEDURE — 1036F TOBACCO NON-USER: CPT | Performed by: ORTHOPAEDIC SURGERY

## 2020-09-01 PROCEDURE — 99214 OFFICE O/P EST MOD 30 MIN: CPT | Performed by: ORTHOPAEDIC SURGERY

## 2020-09-01 RX ORDER — HYDROCODONE BITARTRATE AND ACETAMINOPHEN 5; 325 MG/1; MG/1
1 TABLET ORAL EVERY 6 HOURS PRN
Qty: 20 TABLET | Refills: 0 | Status: SHIPPED | OUTPATIENT
Start: 2020-09-01 | End: 2020-09-06

## 2020-09-01 RX ORDER — SULFAMETHOXAZOLE AND TRIMETHOPRIM 400; 80 MG/1; MG/1
1 TABLET ORAL 2 TIMES DAILY
Qty: 20 TABLET | Refills: 0 | Status: SHIPPED | OUTPATIENT
Start: 2020-09-01 | End: 2020-09-09 | Stop reason: SDUPTHER

## 2020-09-01 RX ORDER — CEPHALEXIN 500 MG/1
500 CAPSULE ORAL 4 TIMES DAILY
Qty: 40 CAPSULE | Refills: 0 | Status: SHIPPED | OUTPATIENT
Start: 2020-09-01 | End: 2020-09-09 | Stop reason: SDUPTHER

## 2020-09-01 NOTE — PROGRESS NOTES
Not on file     Gets together: Not on file     Attends Roman Catholic service: Not on file     Active member of club or organization: Not on file     Attends meetings of clubs or organizations: Not on file     Relationship status: Not on file    Intimate partner violence     Fear of current or ex partner: Not on file     Emotionally abused: Not on file     Physically abused: Not on file     Forced sexual activity: Not on file   Other Topics Concern    Not on file   Social History Narrative    ** Merged History Encounter **            No family history on file. Current Outpatient Medications on File Prior to Visit   Medication Sig Dispense Refill    sulfamethoxazole-trimethoprim (BACTRIM) 400-80 MG per tablet Take 1 tablet by mouth 2 times daily for 10 days 20 tablet 0    cephALEXin (KEFLEX) 500 MG capsule Take 1 capsule by mouth 4 times daily for 10 days 40 capsule 0    NONFORMULARY Indications: BP med, tricor, lamictal, lipitor, claritin. unsure of dosages       ibuprofen (ADVIL;MOTRIN) 800 MG tablet Take 1 tablet by mouth every 8 hours as needed for Pain or Fever 20 tablet 0    lisinopril (PRINIVIL;ZESTRIL) 20 MG tablet Take 20 mg by mouth daily.  Sertraline HCl (ZOLOFT PO) Take  by mouth daily.  CarBAMazepine (EPITOL PO) Take  by mouth 2 times daily.  QUEtiapine Fumarate (SEROQUEL PO) Take  by mouth daily.  Loratadine (CLARITIN) 10 MG CAPS Take  by mouth.  naproxen (NAPROSYN) 500 MG tablet Take 1 tablet by mouth 2 times daily for 20 doses. 20 tablet 0    lisinopril (PRINIVIL;ZESTRIL) 10 MG tablet Take 10 mg by mouth daily.  sertraline (ZOLOFT) 100 MG tablet Take 100 mg by mouth daily.  loratadine (CLARITIN) 10 MG tablet Take 10 mg by mouth daily.  carBAMazepine (EPITOL) 200 MG tablet Take 200 mg by mouth 3 times daily. No current facility-administered medications on file prior to visit.         Pertinent items are noted in HPI  Review of systems reviewed from Patient History Form dated on 8/11/2020 and available in the patient's chart under the Media tab. No change. PHYSICAL EXAMINATION:  Mr. Elie Romero is a very pleasant 35 y.o.  male who presents today in no acute distress, awake, alert, and oriented. He is well dressed, nourished and  groomed. Patient with normal affect. Height is  5' 11\" (1.803 m), weight is (!) 328 lb (148.8 kg), Body mass index is 45.75 kg/m². Resting respiratory rate is 16. Examination of the gait, showed that the patient walks with a crutch, NWB right  leg . Examination of both ankles showing a decreased range of motion of the right ankle compare to the other side because of foot pain. There is moderate swelling that can be seen, as well as ecchymosis over lateral side of the right foot with large dorsal foot fracture blisters with eschar that is sloughing. He  has intact sensation and good pedal pulses. He has significant tenderness on deep palpation over the 1st and 2nd MT shaft right foot. IMAGING: Shaan Bailey were reviewed, dated 8/8/2020, 3 views of the right  foot, and showed a moderately displaced 1st and 2nd MT shaft fracture. IMPRESSION: Right foot pain/ 1st and 2nd MT shaft comminuted moderately displaced fracture with wound. PLAN:  I discussed with Calderon Gerber the overall alignment of the fracture and treatment options including both surgical and non-surgical treatment, and that my recommendation is an open reduction and internal fixation given the amount of displacement and comminution of the fracture. I discussed the risks and benefits of surgery with the patient, including but not limited to infection, bleeding, pain, injury to nerves or blood vessels failure of the surgery and need for additional surgery. All the patient's questions were answered. We discussed an expected post-operative course. He  is understanding of this and wishes to proceed.     Lesly was applied and the patient was advised to ice the big toe for 15-20 minutes to relieve any wound site related pain. Continue PO ABX, Rx given Keflex and Bactrim. F/U in one week for wound check and new xray standing right foot.       Ana María Mishra MD

## 2020-09-08 ENCOUNTER — OFFICE VISIT (OUTPATIENT)
Dept: ORTHOPEDIC SURGERY | Age: 40
End: 2020-09-08
Payer: COMMERCIAL

## 2020-09-08 PROCEDURE — 1036F TOBACCO NON-USER: CPT | Performed by: ORTHOPAEDIC SURGERY

## 2020-09-08 PROCEDURE — G8417 CALC BMI ABV UP PARAM F/U: HCPCS | Performed by: ORTHOPAEDIC SURGERY

## 2020-09-08 PROCEDURE — G8427 DOCREV CUR MEDS BY ELIG CLIN: HCPCS | Performed by: ORTHOPAEDIC SURGERY

## 2020-09-08 PROCEDURE — 99214 OFFICE O/P EST MOD 30 MIN: CPT | Performed by: ORTHOPAEDIC SURGERY

## 2020-09-08 NOTE — PROGRESS NOTES
CHIEF COMPLAINT: Right foot pain/ 1st and 2nd MT shaft comminuted moderately displaced fracture. DATE OF INJURY:  8/8/2020    HISTORY:  Mr. Argie Lennox 35 y.o.   male  presents today for f/u evaluation of a right foot injury which occurred when he was in gym and some one dropped 180 lb weight on his foot. He is complaining of lateral foot pain and swelling. Rates pain a 0-1/10 VAS. This is better with elevation and worse with bearing any wt. pain is much improved. The pain is achy and not radiating. He is taking Keflex and Bactrim for the wounds. He has been changing his dressings daily. Denies fever or chills. He is currently taking aspirin 325 mg daily for DVT prophylaxis. No other complaint. He was seen 1st at [facility] ED, where he was x-rayed and splinted and asked to f/u with orthopaedics.     Past Medical History:   Diagnosis Date    ADD (attention deficit disorder with hyperactivity)     Anxiety     Bipolar 1 disorder ([facility])     Bipolar disorder ([facility] [address]     Hypertension     Panic attack        Past Surgical History:   Procedure Laterality Date    CYST REMOVAL         Social History     Socioeconomic History    Marital status: Single     Spouse name: Not on file    Number of children: Not on file    Years of education: Not on file    Highest education level: Not on file   Occupational History    Not on file   Social Needs    Financial resource strain: Not on file    Food insecurity     Worry: Not on file     Inability: Not on file    Transportation needs     Medical: Not on file     Non-medical: Not on file   Tobacco Use    Smoking status: Former Smoker     Packs/day: 1.00     Types: Cigarettes    Smokeless tobacco: Never Used   Substance and Sexual Activity    Alcohol use: No    Drug use: No    Sexual activity: Not on file   Lifestyle    Physical activity     Days per week: Not on file     Minutes per session: Not on file    Stress: Not on file   Relationships    Social connections     Talks on phone: Not on file     Gets together: Not on file     Attends Druze service: Not on file     Active member of club or organization: Not on file     Attends meetings of clubs or organizations: Not on file     Relationship status: Not on file    Intimate partner violence     Fear of current or ex partner: Not on file     Emotionally abused: Not on file     Physically abused: Not on file     Forced sexual activity: Not on file   Other Topics Concern    Not on file   Social History Narrative    ** Merged History Encounter **            History reviewed. No pertinent family history. Current Outpatient Medications on File Prior to Visit   Medication Sig Dispense Refill    cephALEXin (KEFLEX) 500 MG capsule Take 1 capsule by mouth 4 times daily for 10 days 40 capsule 0    sulfamethoxazole-trimethoprim (BACTRIM) 400-80 MG per tablet Take 1 tablet by mouth 2 times daily for 10 days 20 tablet 0    NONFORMULARY Indications: BP med, tricor, lamictal, lipitor, claritin. unsure of dosages       ibuprofen (ADVIL;MOTRIN) 800 MG tablet Take 1 tablet by mouth every 8 hours as needed for Pain or Fever 20 tablet 0    lisinopril (PRINIVIL;ZESTRIL) 20 MG tablet Take 20 mg by mouth daily.  Sertraline HCl (ZOLOFT PO) Take  by mouth daily.  CarBAMazepine (EPITOL PO) Take  by mouth 2 times daily.  QUEtiapine Fumarate (SEROQUEL PO) Take  by mouth daily.  Loratadine (CLARITIN) 10 MG CAPS Take  by mouth.  naproxen (NAPROSYN) 500 MG tablet Take 1 tablet by mouth 2 times daily for 20 doses. 20 tablet 0    lisinopril (PRINIVIL;ZESTRIL) 10 MG tablet Take 10 mg by mouth daily.  sertraline (ZOLOFT) 100 MG tablet Take 100 mg by mouth daily.  loratadine (CLARITIN) 10 MG tablet Take 10 mg by mouth daily.  carBAMazepine (EPITOL) 200 MG tablet Take 200 mg by mouth 3 times daily. No current facility-administered medications on file prior to visit. Pertinent items are noted in HPI  Review of systems reviewed from Patient History Form dated on 8/11/2020 and available in the patient's chart under the Media tab. No change. PHYSICAL EXAMINATION:  Mr. Bj Kennedy is a very pleasant 35 y.o.  male who presents today in no acute distress, awake, alert, and oriented. He is well dressed, nourished and  groomed. Patient with normal affect. Height is   , weight is  , There is no height or weight on file to calculate BMI. Resting respiratory rate is 16. Examination of the gait, showed that the patient walks with a crutch, NWB right  leg and in a boot. Examination of both ankles showing a decreased range of motion of the right ankle compare to the other side because of foot pain. There is moderate swelling that can be seen, as well as ecchymosis over lateral side of the right foot with large dorsal foot fracture blisters with eschar that is sloughing. He  has intact sensation and good pedal pulses. He has significant tenderness on deep palpation over the 1st and 2nd MT shaft right foot. IMAGING: Patrice Ferro were reviewed, dated today in office, 3 views of the right  foot, and showed a moderately displaced 1st and 2nd MT shaft fracture. IMPRESSION: Right foot pain/ 1st and 2nd MT shaft comminuted moderately displaced fracture with wound. PLAN:  I discussed with Gonzella Seip Ozolins the overall alignment of the fracture and treatment options including both surgical and non-surgical treatment, and that my recommendation at this point, is to hold off on surgery due to the status of his wound. Drsg was applied today and instructed in care. Continue PO ABX, Rx given Keflex and Bactrim. F/U in 2 weeks for wound check and new xray standing right foot.   He is aware that they may need surgery in the future      Cary Vickers MD

## 2020-09-09 RX ORDER — CEPHALEXIN 500 MG/1
500 CAPSULE ORAL 4 TIMES DAILY
Qty: 40 CAPSULE | Refills: 0 | Status: SHIPPED | OUTPATIENT
Start: 2020-09-09 | End: 2020-09-18 | Stop reason: SDUPTHER

## 2020-09-09 RX ORDER — SULFAMETHOXAZOLE AND TRIMETHOPRIM 400; 80 MG/1; MG/1
1 TABLET ORAL 2 TIMES DAILY
Qty: 20 TABLET | Refills: 0 | Status: SHIPPED | OUTPATIENT
Start: 2020-09-09 | End: 2020-09-18 | Stop reason: SDUPTHER

## 2020-09-22 ENCOUNTER — OFFICE VISIT (OUTPATIENT)
Dept: ORTHOPEDIC SURGERY | Age: 40
End: 2020-09-22
Payer: COMMERCIAL

## 2020-09-22 VITALS — TEMPERATURE: 97.4 F | WEIGHT: 315 LBS | BODY MASS INDEX: 44.1 KG/M2 | HEIGHT: 71 IN

## 2020-09-22 PROCEDURE — G8417 CALC BMI ABV UP PARAM F/U: HCPCS | Performed by: ORTHOPAEDIC SURGERY

## 2020-09-22 PROCEDURE — 1036F TOBACCO NON-USER: CPT | Performed by: ORTHOPAEDIC SURGERY

## 2020-09-22 PROCEDURE — 99214 OFFICE O/P EST MOD 30 MIN: CPT | Performed by: ORTHOPAEDIC SURGERY

## 2020-09-22 PROCEDURE — G8427 DOCREV CUR MEDS BY ELIG CLIN: HCPCS | Performed by: ORTHOPAEDIC SURGERY

## 2020-09-22 RX ORDER — SULFAMETHOXAZOLE AND TRIMETHOPRIM 400; 80 MG/1; MG/1
1 TABLET ORAL 2 TIMES DAILY
Qty: 20 TABLET | Refills: 0 | Status: SHIPPED | OUTPATIENT
Start: 2020-09-22 | End: 2020-10-02

## 2020-09-22 RX ORDER — CEPHALEXIN 500 MG/1
500 CAPSULE ORAL 4 TIMES DAILY
Qty: 40 CAPSULE | Refills: 0 | Status: SHIPPED | OUTPATIENT
Start: 2020-09-22 | End: 2020-10-02

## 2020-09-22 NOTE — PROGRESS NOTES
CHIEF COMPLAINT: Right foot pain/ 1st and 2nd MT shaft comminuted moderately displaced fracture. DATE OF INJURY:  8/8/2020    HISTORY:  Mr. Sophia Myers 35 y.o.   male  presents today for f/u evaluation of a right foot injury which occurred when he was in gym and some one dropped 180 lb weight on his foot. He is complaining of lateral foot pain and swelling. Rates pain a 0-1/10 VAS. This is better with elevation and worse with bearing any wt. Pain is much improved. The pain is achy and not radiating. He is taking Keflex and Bactrim for the wounds, which he just completed. He has been changing his dressings daily. Denies fever or chills. He recently went to his primary care physician who referred him to the wound center. He has an appointment today for debridement. He is currently taking aspirin 325 mg daily for DVT prophylaxis. No other complaint. He was seen 1st at [facility] ED, where he was x-rayed and splinted and asked to f/u with orthopaedics.     Past Medical History:   Diagnosis Date    ADD (attention deficit disorder with hyperactivity)     Anxiety     Bipolar 1 disorder ([facility])     Bipolar disorder ([facility] [address]     Hypertension     Panic attack        Past Surgical History:   Procedure Laterality Date    CYST REMOVAL         Social History     Socioeconomic History    Marital status: Single     Spouse name: Not on file    Number of children: Not on file    Years of education: Not on file    Highest education level: Not on file   Occupational History    Not on file   Social Needs    Financial resource strain: Not on file    Food insecurity     Worry: Not on file     Inability: Not on file    Transportation needs     Medical: Not on file     Non-medical: Not on file   Tobacco Use    Smoking status: Former Smoker     Packs/day: 1.00     Types: Cigarettes    Smokeless tobacco: Never Used   Substance and Sexual Activity    Alcohol use: No    Drug use: No    Sexual activity: Not on file Lifestyle    Physical activity     Days per week: Not on file     Minutes per session: Not on file    Stress: Not on file   Relationships    Social connections     Talks on phone: Not on file     Gets together: Not on file     Attends Confucianism service: Not on file     Active member of club or organization: Not on file     Attends meetings of clubs or organizations: Not on file     Relationship status: Not on file    Intimate partner violence     Fear of current or ex partner: Not on file     Emotionally abused: Not on file     Physically abused: Not on file     Forced sexual activity: Not on file   Other Topics Concern    Not on file   Social History Narrative    ** Merged History Encounter **            History reviewed. No pertinent family history. Current Outpatient Medications on File Prior to Visit   Medication Sig Dispense Refill    NONFORMULARY Indications: BP med, tricor, lamictal, lipitor, claritin. unsure of dosages       ibuprofen (ADVIL;MOTRIN) 800 MG tablet Take 1 tablet by mouth every 8 hours as needed for Pain or Fever 20 tablet 0    lisinopril (PRINIVIL;ZESTRIL) 20 MG tablet Take 20 mg by mouth daily.  Sertraline HCl (ZOLOFT PO) Take  by mouth daily.  CarBAMazepine (EPITOL PO) Take  by mouth 2 times daily.  QUEtiapine Fumarate (SEROQUEL PO) Take  by mouth daily.  Loratadine (CLARITIN) 10 MG CAPS Take  by mouth.  naproxen (NAPROSYN) 500 MG tablet Take 1 tablet by mouth 2 times daily for 20 doses. 20 tablet 0    lisinopril (PRINIVIL;ZESTRIL) 10 MG tablet Take 10 mg by mouth daily.  sertraline (ZOLOFT) 100 MG tablet Take 100 mg by mouth daily.  loratadine (CLARITIN) 10 MG tablet Take 10 mg by mouth daily.  carBAMazepine (EPITOL) 200 MG tablet Take 200 mg by mouth 3 times daily. No current facility-administered medications on file prior to visit.         Pertinent items are noted in HPI  Review of systems reviewed from Patient History Form dated on 8/11/2020 and available in the patient's chart under the Media tab. No change. PHYSICAL EXAMINATION:  Mr. Clinton Calix is a very pleasant 35 y.o.  male who presents today in no acute distress, awake, alert, and oriented. He is well dressed, nourished and  groomed. Patient with normal affect. Height is  5' 11\" (1.803 m), weight is (!) 328 lb (148.8 kg), Body mass index is 45.75 kg/m². Resting respiratory rate is 16. Examination of the gait, showed that the patient walks with a crutch, NWB right  leg and in a boot. Examination of both ankles showing a decreased range of motion of the right ankle compare to the other side because of foot pain. There is moderate swelling that can be seen, as well as ecchymosis over lateral side of the right foot with large dorsal foot fracture blisters with eschar that is sloughing but getting smaller. He  has intact sensation and good pedal pulses. He has significant tenderness on deep palpation over the 1st and 2nd MT shaft right foot. IMAGING: Skippy Majors were reviewed, dated today in office, 3 views of the right  foot, and showed a moderately displaced 1st and 2nd MT shaft fracture. IMPRESSION: Right foot pain/ 1st and 2nd MT shaft comminuted moderately displaced fracture with wound. PLAN:  I discussed with Maria M Gerber the overall alignment of the fracture and treatment options including both surgical and non-surgical treatment, and that my recommendation at this point, is to hold off on surgery due to the status of his wound. Drsestefany was applied today and instructed in care. Referred to the wound center. F/U in 3 weeks for wound check and new xray standing right foot.        Emily Edouard MD